# Patient Record
Sex: FEMALE | Race: WHITE | NOT HISPANIC OR LATINO | Employment: FULL TIME | URBAN - METROPOLITAN AREA
[De-identification: names, ages, dates, MRNs, and addresses within clinical notes are randomized per-mention and may not be internally consistent; named-entity substitution may affect disease eponyms.]

---

## 2023-10-23 ENCOUNTER — LAB REQUISITION (OUTPATIENT)
Dept: LAB | Facility: CLINIC | Age: 43
End: 2023-10-23

## 2023-10-23 DIAGNOSIS — R53.83 OTHER FATIGUE: ICD-10-CM

## 2023-10-23 DIAGNOSIS — Z13.220 ENCOUNTER FOR SCREENING FOR LIPOID DISORDERS: ICD-10-CM

## 2023-10-23 PROCEDURE — 80061 LIPID PANEL: CPT | Performed by: PHYSICIAN ASSISTANT

## 2023-10-23 PROCEDURE — 85652 RBC SED RATE AUTOMATED: CPT | Performed by: PHYSICIAN ASSISTANT

## 2023-10-23 PROCEDURE — 86140 C-REACTIVE PROTEIN: CPT | Performed by: PHYSICIAN ASSISTANT

## 2023-10-24 LAB
CHOLEST SERPL-MCNC: 177 MG/DL
CRP SERPL-MCNC: 9.56 MG/L
ERYTHROCYTE [SEDIMENTATION RATE] IN BLOOD BY WESTERGREN METHOD: 22 MM/HR (ref 0–20)
HDLC SERPL-MCNC: 49 MG/DL
LDLC SERPL CALC-MCNC: 85 MG/DL
NONHDLC SERPL-MCNC: 128 MG/DL
TRIGL SERPL-MCNC: 216 MG/DL

## 2023-10-26 ENCOUNTER — LAB REQUISITION (OUTPATIENT)
Dept: LAB | Facility: CLINIC | Age: 43
End: 2023-10-26

## 2023-10-26 DIAGNOSIS — R79.82 ELEVATED C-REACTIVE PROTEIN (CRP): ICD-10-CM

## 2023-10-26 DIAGNOSIS — R53.82 CHRONIC FATIGUE, UNSPECIFIED: ICD-10-CM

## 2023-10-26 PROCEDURE — 86038 ANTINUCLEAR ANTIBODIES: CPT | Performed by: STUDENT IN AN ORGANIZED HEALTH CARE EDUCATION/TRAINING PROGRAM

## 2023-10-26 PROCEDURE — 84443 ASSAY THYROID STIM HORMONE: CPT | Performed by: STUDENT IN AN ORGANIZED HEALTH CARE EDUCATION/TRAINING PROGRAM

## 2023-10-26 PROCEDURE — 84439 ASSAY OF FREE THYROXINE: CPT | Performed by: STUDENT IN AN ORGANIZED HEALTH CARE EDUCATION/TRAINING PROGRAM

## 2023-10-27 LAB
T4 FREE SERPL-MCNC: 2.24 NG/DL (ref 0.9–1.7)
TSH SERPL DL<=0.005 MIU/L-ACNC: 0.01 UIU/ML (ref 0.3–4.2)

## 2023-10-30 LAB
ANA PAT SER IF-IMP: ABNORMAL
ANA SER QL IF: ABNORMAL
ANA TITR SER IF: ABNORMAL {TITER}

## 2023-11-06 ENCOUNTER — LAB REQUISITION (OUTPATIENT)
Dept: LAB | Facility: CLINIC | Age: 43
End: 2023-11-06

## 2023-11-06 DIAGNOSIS — E05.90 THYROTOXICOSIS, UNSPECIFIED WITHOUT THYROTOXIC CRISIS OR STORM: ICD-10-CM

## 2023-11-06 PROCEDURE — 84480 ASSAY TRIIODOTHYRONINE (T3): CPT | Performed by: STUDENT IN AN ORGANIZED HEALTH CARE EDUCATION/TRAINING PROGRAM

## 2023-11-06 PROCEDURE — 83520 IMMUNOASSAY QUANT NOS NONAB: CPT | Performed by: STUDENT IN AN ORGANIZED HEALTH CARE EDUCATION/TRAINING PROGRAM

## 2023-11-06 PROCEDURE — 84439 ASSAY OF FREE THYROXINE: CPT | Performed by: STUDENT IN AN ORGANIZED HEALTH CARE EDUCATION/TRAINING PROGRAM

## 2023-11-06 PROCEDURE — 84443 ASSAY THYROID STIM HORMONE: CPT | Performed by: STUDENT IN AN ORGANIZED HEALTH CARE EDUCATION/TRAINING PROGRAM

## 2023-11-07 LAB
T3 SERPL-MCNC: 141 NG/DL (ref 85–202)
T4 FREE SERPL-MCNC: 1.74 NG/DL (ref 0.9–1.7)
TSH SERPL DL<=0.005 MIU/L-ACNC: <0.01 UIU/ML (ref 0.3–4.2)

## 2023-11-08 LAB — TSH RECEP AB SER-ACNC: <1.1 IU/L (ref 0–1.75)

## 2024-01-24 ENCOUNTER — APPOINTMENT (OUTPATIENT)
Dept: CT IMAGING | Facility: CLINIC | Age: 44
End: 2024-01-24
Attending: EMERGENCY MEDICINE
Payer: COMMERCIAL

## 2024-01-24 ENCOUNTER — APPOINTMENT (OUTPATIENT)
Dept: GENERAL RADIOLOGY | Facility: CLINIC | Age: 44
End: 2024-01-24
Attending: EMERGENCY MEDICINE
Payer: COMMERCIAL

## 2024-01-24 ENCOUNTER — HOSPITAL ENCOUNTER (EMERGENCY)
Facility: CLINIC | Age: 44
Discharge: HOME | End: 2024-01-24
Attending: EMERGENCY MEDICINE | Admitting: EMERGENCY MEDICINE
Payer: COMMERCIAL

## 2024-01-24 VITALS
OXYGEN SATURATION: 98 % | HEIGHT: 65 IN | HEART RATE: 70 BPM | SYSTOLIC BLOOD PRESSURE: 127 MMHG | WEIGHT: 175.49 LBS | RESPIRATION RATE: 16 BRPM | BODY MASS INDEX: 29.24 KG/M2 | DIASTOLIC BLOOD PRESSURE: 85 MMHG | TEMPERATURE: 97.9 F

## 2024-01-24 DIAGNOSIS — R07.9 CHEST PAIN, UNSPECIFIED TYPE: Primary | ICD-10-CM

## 2024-01-24 DIAGNOSIS — R53.83 FATIGUE, UNSPECIFIED TYPE: ICD-10-CM

## 2024-01-24 DIAGNOSIS — R06.02 SHORTNESS OF BREATH: ICD-10-CM

## 2024-01-24 DIAGNOSIS — Q34.1 MEDIASTINAL CYST: ICD-10-CM

## 2024-01-24 DIAGNOSIS — R53.1 GENERALIZED WEAKNESS: ICD-10-CM

## 2024-01-24 DIAGNOSIS — D17.79 ADRENAL MYELOLIPOMA: ICD-10-CM

## 2024-01-24 LAB
ALBUMIN SERPL BCG-MCNC: 4.5 G/DL (ref 3.5–5.2)
ALP SERPL-CCNC: 55 U/L (ref 40–150)
ALT SERPL W P-5'-P-CCNC: 19 U/L (ref 0–50)
ANION GAP SERPL CALCULATED.3IONS-SCNC: 12 MMOL/L (ref 7–15)
ANION GAP SERPL CALCULATED.3IONS-SCNC: 13 MMOL/L (ref 7–15)
AST SERPL W P-5'-P-CCNC: 22 U/L (ref 0–45)
ATRIAL RATE - MUSE: 62 BPM
BASOPHILS # BLD AUTO: 0.1 10E3/UL (ref 0–0.2)
BASOPHILS # BLD AUTO: 0.1 10E3/UL (ref 0–0.2)
BASOPHILS NFR BLD AUTO: 1 %
BASOPHILS NFR BLD AUTO: 1 %
BILIRUB SERPL-MCNC: 0.4 MG/DL
BUN SERPL-MCNC: 13.6 MG/DL (ref 6–20)
BUN SERPL-MCNC: 14.2 MG/DL (ref 6–20)
CALCIUM SERPL-MCNC: 9.2 MG/DL (ref 8.6–10)
CALCIUM SERPL-MCNC: 9.2 MG/DL (ref 8.6–10)
CHLORIDE SERPL-SCNC: 98 MMOL/L (ref 98–107)
CHLORIDE SERPL-SCNC: 99 MMOL/L (ref 98–107)
CREAT SERPL-MCNC: 0.57 MG/DL (ref 0.51–0.95)
CREAT SERPL-MCNC: 0.59 MG/DL (ref 0.51–0.95)
D DIMER PPP FEU-MCNC: 0.37 UG/ML FEU (ref 0–0.5)
DEPRECATED HCO3 PLAS-SCNC: 23 MMOL/L (ref 22–29)
DEPRECATED HCO3 PLAS-SCNC: 23 MMOL/L (ref 22–29)
DIASTOLIC BLOOD PRESSURE - MUSE: NORMAL MMHG
EGFRCR SERPLBLD CKD-EPI 2021: >90 ML/MIN/1.73M2
EGFRCR SERPLBLD CKD-EPI 2021: >90 ML/MIN/1.73M2
EOSINOPHIL # BLD AUTO: 0.2 10E3/UL (ref 0–0.7)
EOSINOPHIL # BLD AUTO: 0.2 10E3/UL (ref 0–0.7)
EOSINOPHIL NFR BLD AUTO: 2 %
EOSINOPHIL NFR BLD AUTO: 2 %
ERYTHROCYTE [DISTWIDTH] IN BLOOD BY AUTOMATED COUNT: 13.8 % (ref 10–15)
ERYTHROCYTE [DISTWIDTH] IN BLOOD BY AUTOMATED COUNT: 14 % (ref 10–15)
FLUAV RNA SPEC QL NAA+PROBE: NEGATIVE
FLUBV RNA RESP QL NAA+PROBE: NEGATIVE
GLUCOSE SERPL-MCNC: 101 MG/DL (ref 70–99)
GLUCOSE SERPL-MCNC: 99 MG/DL (ref 70–99)
HCG SERPL QL: NEGATIVE
HCT VFR BLD AUTO: 35.3 % (ref 35–47)
HCT VFR BLD AUTO: 36.5 % (ref 35–47)
HGB BLD-MCNC: 11.4 G/DL (ref 11.7–15.7)
HGB BLD-MCNC: 11.8 G/DL (ref 11.7–15.7)
HOLD SPECIMEN: NORMAL
HOLD SPECIMEN: NORMAL
IMM GRANULOCYTES # BLD: 0 10E3/UL
IMM GRANULOCYTES # BLD: 0.1 10E3/UL
IMM GRANULOCYTES NFR BLD: 0 %
IMM GRANULOCYTES NFR BLD: 1 %
INTERPRETATION ECG - MUSE: NORMAL
LYMPHOCYTES # BLD AUTO: 4.4 10E3/UL (ref 0.8–5.3)
LYMPHOCYTES # BLD AUTO: 4.7 10E3/UL (ref 0.8–5.3)
LYMPHOCYTES NFR BLD AUTO: 43 %
LYMPHOCYTES NFR BLD AUTO: 46 %
MCH RBC QN AUTO: 27.6 PG (ref 26.5–33)
MCH RBC QN AUTO: 27.8 PG (ref 26.5–33)
MCHC RBC AUTO-ENTMCNC: 32.3 G/DL (ref 31.5–36.5)
MCHC RBC AUTO-ENTMCNC: 32.3 G/DL (ref 31.5–36.5)
MCV RBC AUTO: 86 FL (ref 78–100)
MCV RBC AUTO: 86 FL (ref 78–100)
MONOCYTES # BLD AUTO: 0.5 10E3/UL (ref 0–1.3)
MONOCYTES # BLD AUTO: 0.5 10E3/UL (ref 0–1.3)
MONOCYTES NFR BLD AUTO: 4 %
MONOCYTES NFR BLD AUTO: 5 %
NEUTROPHILS # BLD AUTO: 4.8 10E3/UL (ref 1.6–8.3)
NEUTROPHILS # BLD AUTO: 5.1 10E3/UL (ref 1.6–8.3)
NEUTROPHILS NFR BLD AUTO: 46 %
NEUTROPHILS NFR BLD AUTO: 49 %
NRBC # BLD AUTO: 0 10E3/UL
NRBC # BLD AUTO: 0 10E3/UL
NRBC BLD AUTO-RTO: 0 /100
NRBC BLD AUTO-RTO: 0 /100
P AXIS - MUSE: 47 DEGREES
PLATELET # BLD AUTO: 295 10E3/UL (ref 150–450)
PLATELET # BLD AUTO: 307 10E3/UL (ref 150–450)
POTASSIUM SERPL-SCNC: 4 MMOL/L (ref 3.4–5.3)
POTASSIUM SERPL-SCNC: 4 MMOL/L (ref 3.4–5.3)
PR INTERVAL - MUSE: 132 MS
PROT SERPL-MCNC: 7.9 G/DL (ref 6.4–8.3)
QRS DURATION - MUSE: 78 MS
QT - MUSE: 432 MS
QTC - MUSE: 438 MS
R AXIS - MUSE: 7 DEGREES
RBC # BLD AUTO: 4.13 10E6/UL (ref 3.8–5.2)
RBC # BLD AUTO: 4.24 10E6/UL (ref 3.8–5.2)
RSV RNA SPEC NAA+PROBE: NEGATIVE
SARS-COV-2 RNA RESP QL NAA+PROBE: NEGATIVE
SODIUM SERPL-SCNC: 133 MMOL/L (ref 135–145)
SODIUM SERPL-SCNC: 135 MMOL/L (ref 135–145)
SYSTOLIC BLOOD PRESSURE - MUSE: NORMAL MMHG
T AXIS - MUSE: 18 DEGREES
TROPONIN T SERPL HS-MCNC: <6 NG/L
TROPONIN T SERPL HS-MCNC: <6 NG/L
VENTRICULAR RATE- MUSE: 62 BPM
WBC # BLD AUTO: 10.2 10E3/UL (ref 4–11)
WBC # BLD AUTO: 10.3 10E3/UL (ref 4–11)

## 2024-01-24 PROCEDURE — 80048 BASIC METABOLIC PNL TOTAL CA: CPT | Performed by: EMERGENCY MEDICINE

## 2024-01-24 PROCEDURE — 71260 CT THORAX DX C+: CPT

## 2024-01-24 PROCEDURE — 84703 CHORIONIC GONADOTROPIN ASSAY: CPT | Performed by: EMERGENCY MEDICINE

## 2024-01-24 PROCEDURE — 250N000009 HC RX 250: Performed by: EMERGENCY MEDICINE

## 2024-01-24 PROCEDURE — 36415 COLL VENOUS BLD VENIPUNCTURE: CPT | Performed by: EMERGENCY MEDICINE

## 2024-01-24 PROCEDURE — 82040 ASSAY OF SERUM ALBUMIN: CPT | Performed by: EMERGENCY MEDICINE

## 2024-01-24 PROCEDURE — 85379 FIBRIN DEGRADATION QUANT: CPT | Performed by: EMERGENCY MEDICINE

## 2024-01-24 PROCEDURE — 250N000013 HC RX MED GY IP 250 OP 250 PS 637: Performed by: EMERGENCY MEDICINE

## 2024-01-24 PROCEDURE — 84484 ASSAY OF TROPONIN QUANT: CPT | Performed by: EMERGENCY MEDICINE

## 2024-01-24 PROCEDURE — 96375 TX/PRO/DX INJ NEW DRUG ADDON: CPT

## 2024-01-24 PROCEDURE — 85025 COMPLETE CBC W/AUTO DIFF WBC: CPT | Performed by: EMERGENCY MEDICINE

## 2024-01-24 PROCEDURE — 71046 X-RAY EXAM CHEST 2 VIEWS: CPT

## 2024-01-24 PROCEDURE — 99285 EMERGENCY DEPT VISIT HI MDM: CPT | Mod: 25

## 2024-01-24 PROCEDURE — 250N000011 HC RX IP 250 OP 636: Performed by: EMERGENCY MEDICINE

## 2024-01-24 PROCEDURE — 87637 SARSCOV2&INF A&B&RSV AMP PRB: CPT | Performed by: EMERGENCY MEDICINE

## 2024-01-24 PROCEDURE — 96374 THER/PROPH/DIAG INJ IV PUSH: CPT | Mod: 59

## 2024-01-24 PROCEDURE — 93005 ELECTROCARDIOGRAM TRACING: CPT

## 2024-01-24 RX ORDER — IOPAMIDOL 755 MG/ML
120 INJECTION, SOLUTION INTRAVASCULAR ONCE
Status: COMPLETED | OUTPATIENT
Start: 2024-01-24 | End: 2024-01-24

## 2024-01-24 RX ORDER — DIPHENHYDRAMINE HYDROCHLORIDE 50 MG/ML
50 INJECTION INTRAMUSCULAR; INTRAVENOUS ONCE
Status: COMPLETED | OUTPATIENT
Start: 2024-01-24 | End: 2024-01-24

## 2024-01-24 RX ORDER — KETOROLAC TROMETHAMINE 15 MG/ML
15 INJECTION, SOLUTION INTRAMUSCULAR; INTRAVENOUS ONCE
Status: COMPLETED | OUTPATIENT
Start: 2024-01-24 | End: 2024-01-24

## 2024-01-24 RX ORDER — METHYLPREDNISOLONE SODIUM SUCCINATE 125 MG/2ML
125 INJECTION, POWDER, LYOPHILIZED, FOR SOLUTION INTRAMUSCULAR; INTRAVENOUS ONCE
Status: COMPLETED | OUTPATIENT
Start: 2024-01-24 | End: 2024-01-24

## 2024-01-24 RX ORDER — MAGNESIUM HYDROXIDE/ALUMINUM HYDROXICE/SIMETHICONE 120; 1200; 1200 MG/30ML; MG/30ML; MG/30ML
15 SUSPENSION ORAL ONCE
Status: COMPLETED | OUTPATIENT
Start: 2024-01-24 | End: 2024-01-24

## 2024-01-24 RX ORDER — ACETAMINOPHEN 325 MG/1
975 TABLET ORAL ONCE
Status: COMPLETED | OUTPATIENT
Start: 2024-01-24 | End: 2024-01-24

## 2024-01-24 RX ORDER — MORPHINE SULFATE 4 MG/ML
4 INJECTION, SOLUTION INTRAMUSCULAR; INTRAVENOUS
Status: COMPLETED | OUTPATIENT
Start: 2024-01-24 | End: 2024-01-24

## 2024-01-24 RX ORDER — ONDANSETRON 2 MG/ML
4 INJECTION INTRAMUSCULAR; INTRAVENOUS
Status: COMPLETED | OUTPATIENT
Start: 2024-01-24 | End: 2024-01-24

## 2024-01-24 RX ADMIN — DIPHENHYDRAMINE HYDROCHLORIDE 50 MG: 50 INJECTION INTRAMUSCULAR; INTRAVENOUS at 15:25

## 2024-01-24 RX ADMIN — MORPHINE SULFATE 4 MG: 4 INJECTION, SOLUTION INTRAMUSCULAR; INTRAVENOUS at 16:21

## 2024-01-24 RX ADMIN — KETOROLAC TROMETHAMINE 15 MG: 15 INJECTION, SOLUTION INTRAMUSCULAR; INTRAVENOUS at 14:22

## 2024-01-24 RX ADMIN — SODIUM CHLORIDE 60 ML: 9 INJECTION, SOLUTION INTRAVENOUS at 16:33

## 2024-01-24 RX ADMIN — ALUMINUM HYDROXIDE, MAGNESIUM HYDROXIDE, AND SIMETHICONE 15 ML: 200; 200; 20 SUSPENSION ORAL at 14:22

## 2024-01-24 RX ADMIN — METHYLPREDNISOLONE SODIUM SUCCINATE 125 MG: 125 INJECTION, POWDER, FOR SOLUTION INTRAMUSCULAR; INTRAVENOUS at 15:25

## 2024-01-24 RX ADMIN — ACETAMINOPHEN 975 MG: 325 TABLET, FILM COATED ORAL at 14:22

## 2024-01-24 RX ADMIN — ONDANSETRON 4 MG: 2 INJECTION INTRAMUSCULAR; INTRAVENOUS at 16:21

## 2024-01-24 RX ADMIN — IOPAMIDOL 72 ML: 755 INJECTION, SOLUTION INTRAVENOUS at 16:32

## 2024-01-24 ASSESSMENT — ENCOUNTER SYMPTOMS
CHEST TIGHTNESS: 1
WEAKNESS: 1
NECK PAIN: 0
NECK STIFFNESS: 0
HEADACHES: 0
CHILLS: 1
SHORTNESS OF BREATH: 1
DYSURIA: 0
COUGH: 1
HEMATURIA: 0
RHINORRHEA: 0
VOMITING: 0
NAUSEA: 1
ABDOMINAL PAIN: 0
BLOOD IN STOOL: 0
DIARRHEA: 0

## 2024-01-24 ASSESSMENT — ACTIVITIES OF DAILY LIVING (ADL)
ADLS_ACUITY_SCORE: 35

## 2024-01-24 NOTE — ED PROVIDER NOTES
"  History     Chief Complaint:  Chest Pain    The history is provided by the patient.     Scotty Ortiz is a 43 year old female with history of hyperlipidemia presenting for evaluation of chest pain. Scotty reports right-sided chest heaviness for the last two weeks that is exacerbated with inhalation and improved with repositioning. She describes the pain as sharp and intermittently present, more severe in the mornings and typically a 7/10.  She endorses that this pain radiates to her back near her bra line. She also notes a nonproductive cough, chills, nausea, dyspnea, and shortness of breath. She also notes difficulty sleeping, stating that she is often \"gasping for air\" throughout the night. She denies throat swelling, fevers, rhinorrhea, congestion, abdominal pain, hematuria, dysuria, black stool, or bloody stool. She denies leg swelling. She denies a history of GABRIEL. She denies a family history of aortic dissection or aneurysm. She states that she has felt generally weak since the summer and has been working with an endocrinologist to address the symptoms, noting that the weakness has worsened back to its initial summer severity recently.  She was told that she has thyroid disorder and was started on a thyroid medication.  She has not been taking this medication as she is not sure if it would worsen her condition.    Review of Systems   Constitutional:  Positive for chills.   HENT:  Negative for congestion and rhinorrhea.    Respiratory:  Positive for cough, chest tightness and shortness of breath.    Cardiovascular:  Positive for chest pain.   Gastrointestinal:  Positive for nausea. Negative for abdominal pain, blood in stool, diarrhea and vomiting.   Genitourinary:  Negative for dysuria and hematuria.   Musculoskeletal:  Negative for neck pain and neck stiffness.   Neurological:  Positive for weakness. Negative for headaches.     Independent Historian:   None - Patient Only    Review of External Notes: " "  10/18/23 Urgency Room Visit note    Medications:    Decadron  Mirena  Metformin  Zofran    Past Medical History:    HPV anogenital infection  Hyperlipidemia  Iron deficiency anemia due to chronic blood loss  Prediabetes    Past Surgical History:    Bladder suspension    Physical Exam   Patient Vitals for the past 24 hrs:   BP Temp Temp src Pulse Resp SpO2 Height Weight   01/24/24 1735 127/85 -- -- 70 -- 98 % -- --   01/24/24 1645 (!) 145/90 -- -- -- -- 100 % -- --   01/24/24 1501 -- -- -- 56 -- 100 % -- --   01/24/24 1417 (!) 141/88 -- -- 60 16 99 % -- --   01/24/24 1416 130/82 -- -- -- -- -- -- --   01/24/24 1104 139/85 97.9  F (36.6  C) Oral 60 18 100 % 1.651 m (5' 5\") 79.6 kg (175 lb 7.8 oz)      Constitutional:       General: Not in acute distress.     Appearance: Normal appearance.   HENT:      Head: Normocephalic and atraumatic.   Eyes:      Extraocular Movements: Extraocular movements intact.      Conjunctiva/sclera: Conjunctivae normal.   Cardiovascular:      Rate and Rhythm: Regular rate and rhythm.     Circulation: Even bilateral upper extremity pulses.  Pulmonary:      Effort: Pulmonary effort is normal. No respiratory distress.      Breath sounds: Normal breath sounds.  Abdominal:      General: Abdomen is flat. There is no distension.      Palpations: Abdomen is soft.      Tenderness: There is no abdominal tenderness.   Musculoskeletal:      Cervical back: Normal range of motion. No rigidity.      Right lower leg: No edema.      Left lower leg: No edema.   Skin:     General: Skin is warm and dry.   Neurological:      General: No focal deficit present.      Mental Status: Alert and oriented to person, place, and time.   Psychiatric:         Mood and Affect: Mood normal.         Behavior: Behavior normal.    Emergency Department Course   ECG  ECG results from 01/24/24   EKG 12 lead     Value    Systolic Blood Pressure     Diastolic Blood Pressure     Ventricular Rate 62    Atrial Rate 62    LA Interval " 132    QRS Duration 78        QTc 438    P Axis 47    R AXIS 7    T Axis 18    Interpretation ECG      Sinus rhythm  Normal ECG  No previous ECGs available  Unconfirmed report - interpretation of this ECG is computer generated - see medical record for final interpretation  Confirmed by - EMERGENCY ROOM, PHYSICIAN ([id number]),  JADON SPRING ([id number]) on 1/24/2024 12:23:16 PM       Normal sinus rhythm.  Rate of 62.  Normal AK and QRS.  Normal QTc.  No acute ST elevation or depression.  No prior ECG for comparison.    See ED course for independent interpretation.     Imaging:  CT Aortic Survey w Contrast   Final Result   IMPRESSION:      1.  No aortic dissection or other acute aortic abnormality.      2.  No pulmonary embolism or other acute intrathoracic abnormality.      3.  No acute process in the abdomen or pelvis.      4.  Incidental nonemergent findings as described in the report body.       XR Chest 2 Views   Final Result   IMPRESSION: No acute cardiopulmonary disease.      LEATHA NEWELL MD            SYSTEM ID:  [id number]         Report per radiology    Laboratory:  Labs Ordered and Resulted from Time of ED Arrival to Time of ED Departure   CBC WITH PLATELETS AND DIFFERENTIAL - Abnormal       Result Value    WBC Count 10.3      RBC Count 4.13      Hemoglobin 11.4 (*)     Hematocrit 35.3      MCV 86      MCH 27.6      MCHC 32.3      RDW 13.8      Platelet Count 295      % Neutrophils 49      % Lymphocytes 43      % Monocytes 5      % Eosinophils 2      % Basophils 1      % Immature Granulocytes 0      NRBCs per 100 WBC 0      Absolute Neutrophils 5.1      Absolute Lymphocytes 4.4      Absolute Monocytes 0.5      Absolute Eosinophils 0.2      Absolute Basophils 0.1      Absolute Immature Granulocytes 0.0      Absolute NRBCs 0.0     COMPREHENSIVE METABOLIC PANEL - Abnormal    Sodium 133 (*)     Potassium 4.0      Carbon Dioxide (CO2) 23      Anion Gap 12      Urea Nitrogen 13.6      Creatinine 0.57       GFR Estimate >90      Calcium 9.2      Chloride 98      Glucose 101 (*)     Alkaline Phosphatase 55      AST 22      ALT 19      Protein Total 7.9      Albumin 4.5      Bilirubin Total 0.4     TROPONIN T, HIGH SENSITIVITY - Normal    Troponin T, High Sensitivity <6     BASIC METABOLIC PANEL - Normal    Sodium 135      Potassium 4.0      Chloride 99      Carbon Dioxide (CO2) 23      Anion Gap 13      Urea Nitrogen 14.2      Creatinine 0.59      GFR Estimate >90      Calcium 9.2      Glucose 99     D DIMER QUANTITATIVE - Normal    D-Dimer Quantitative 0.37     INFLUENZA A/B, RSV, & SARS-COV2 PCR - Normal    Influenza A PCR Negative      Influenza B PCR Negative      RSV PCR Negative      SARS CoV2 PCR Negative     TROPONIN T, HIGH SENSITIVITY - Normal    Troponin T, High Sensitivity <6     HCG QUALITATIVE PREGNANCY - Normal    hCG Serum Qualitative Negative     CBC WITH PLATELETS AND DIFFERENTIAL    WBC Count 10.2      RBC Count 4.24      Hemoglobin 11.8      Hematocrit 36.5      MCV 86      MCH 27.8      MCHC 32.3      RDW 14.0      Platelet Count 307      % Neutrophils 46      % Lymphocytes 46      % Monocytes 4      % Eosinophils 2      % Basophils 1      % Immature Granulocytes 1      NRBCs per 100 WBC 0      Absolute Neutrophils 4.8      Absolute Lymphocytes 4.7      Absolute Monocytes 0.5      Absolute Eosinophils 0.2      Absolute Basophils 0.1      Absolute Immature Granulocytes 0.1      Absolute NRBCs 0.0       Emergency Department Course & Assessments:       Interventions:  Medications   ketorolac (TORADOL) injection 15 mg (15 mg Intravenous $Given 1/24/24 1422)   acetaminophen (TYLENOL) tablet 975 mg (975 mg Oral $Given 1/24/24 1422)   alum & mag hydroxide-simethicone (MAALOX) suspension 15 mL (15 mLs Oral $Given 1/24/24 1422)   methylPREDNISolone sodium succinate (solu-MEDROL) injection 125 mg (125 mg Intravenous $Given 1/24/24 1525)   diphenhydrAMINE (BENADRYL) injection 50 mg (50 mg Intravenous $Given  1/24/24 1525)   morphine (PF) injection 4 mg (4 mg Intravenous $Given 1/24/24 1621)   ondansetron (ZOFRAN) injection 4 mg (4 mg Intravenous $Given 1/24/24 1621)   iopamidol (ISOVUE-370) solution 120 mL (72 mLs Intravenous $Given 1/24/24 1632)   Saline CT scan flush (60 mLs Intravenous $Given 1/24/24 1633)     Independent Interpretation (X-rays, CTs, rhythm strip):  On my independent interpretation of chest x-ray, there is no obvious focal opacity, mediastinal widening, or pneumothorax.    Assessments/Consultations/Discussion of Management or Tests:  ED Course as of 01/24/24 1856   Wed Jan 24, 2024   1156 I obtained history and examined the patient as noted above.    1204 EKG 12 lead  Normal sinus rhythm.  Rate of 62.  Normal TX and QRS.  Normal QTc.  No acute ST elevation or depression.  No prior ECG for comparison.   1224 Troponin T, High Sensitivity: <6   1342 D-Dimer Quantitative: 0.37   1356 Troponin T, High Sensitivity: <6  2 set negative   1404 XR Chest 2 Views  On my independent interpretation of chest x-ray, there is no obvious focal opacity, mediastinal widening, or pneumothorax.   1500 On reevaluation, patient endorses that medication did not help with her symptoms.  She is endorsing still sharp chest pain in midsternum that radiates to her back.  At this time, dimer negative, no pulse deficits, systolic pressure difference less than 20 and there is no mediastinal widening on chest x-ray.  However, after shared decision discussion, patient is concerned about dissection and would prefer to undergo CT aortic survey.  No history of anaphylaxis, will pretreat with IV Solu-Medrol and Benadryl prior to imaging due to history of iodine allergy.   1730 CT Aortic Survey w Contrast  Simple 1.8 x 1.2 cm anterior mediastinal cyst   1755 On reevaluation, patient's pain completely resolved after morphine. Patient updated on CT findings including all incidental findings.  Unclear cause of patient's chest pain today.  Low  risk heart score.  No indication for stress testing at this time.  No evidence of dissection on CT aortic survey.  There is an incidental finding of a mediastinal cyst, pending thoracic surgery callback.  Patient otherwise has not had any adverse side effects/allergic reaction since receiving IV contrast dye with pretreatment.  Advised for patient to note this for her future.  Pending callback from thoracic surgery, anticipate discharge home.  Patient is to follow-up with PCP for GABRIEL workup and further evaluation for her symptoms.  Patient is supposed to be on thyroid medication.  Advised for patient to take medication as instructed or consult prescribing physician.  Discussed return precautions.  Answered all questions.  Patient and significant other voiced understanding and agreement with plan.   1824 I spoke with Dr. Lux, thoracic surgery.   1827 I discussed phone call conversation with thoracic surgery with the patient.  Will discharge to outpatient follow-up.  Discussed return precautions.  Answered all questions.  Patient and family voiced understanding and agreement with plan.     Social Determinants of Health affecting care:   None    Disposition:  The patient was discharged to home.     Impression & Plan      HEART Score  Background  Calculates the overall risk of adverse event in patient's presenting with chest pain.  Based on 5 criteria (each assigned 0-2 points) including suspiciousness of history, EKG, age, risk factors and troponin.    Data  43 year old female  does not have a problem list on file.   has no history on file for tobacco use.  family history is not on file.  Recent Labs   Lab 01/24/24  1314 01/24/24  1133   CTROPT <6 <6     Criteria   0-2 points for each of 5 items (maximum of 10 points):  Score 0- History slightly suspicious for coronary syndrome  Score 0- EKG Normal  Score 1- Age 45 to 65 years old  Score 2- Three or more risk factors for or history of atherosclerotic  disease  Score 0- Within normal limits for troponin levels  Interpretation  Risk of adverse outcome  Heart Score: 3  Total Score 0-3- Adverse Outcome Risk 2.5% - Supports early discharge with appropriate follow-up    Medical Decision Makin-year-old female as described above presents to the emergency department for chest pain and shortness of breath.  Patient hemodynamically stable at time of evaluation.  Afebrile.  Saturating 100% on room air.  Differential diagnosis considered includes, but not limited to, ACS, PE, pericarditis/myocarditis, and thoracic aortic aneurysm/dissection.  History of PE postoperatively.  Cardiac workup ordered.  2 set cardiac enzymes.  D-dimer for screening for pulmonary embolism and indirect evaluation for thoracic aortic dissection.  The patient does have an iodine allergy with a distant history, but endorses having hives, but no anaphylaxis requiring epinephrine injection.  If dimer positive, willing to undergo pretreatment imaging.  Discussed care plan with patient who voiced understanding and agreement with plan.  Answered all questions.  Additional workup and orders as listed in chart.     Please refer to ED course above as part of continuation of MDM for details on the patient's treatment course and any changes or updates in care plan beyond my initial evaluation and MDM creation.    Diagnosis:    ICD-10-CM    1. Chest pain, unspecified type  R07.9       2. Shortness of breath  R06.02       3. Generalized weakness  R53.1       4. Fatigue, unspecified type  R53.83       5. Mediastinal cyst  Q34.1       6. Adrenal myelolipoma  D17.79         Scribe Disclosure:  Sheela REID, am serving as a scribe at 11:55 AM on 2024 to document services personally performed by Ace Mancilla DO based on my observations and the provider's statements to me.   2024   Ace Mancilla DO Yeh, Ferris, DO  [id number]

## 2024-01-24 NOTE — ED TRIAGE NOTES
Pt arrives with SOB and chest pain for the past week. Pt has been seen in clinic for symptoms and started BP medications back in October. Symptoms keep returning and pt was informed to go to ER for further workup. HX of PE back in 2013, pt was taking thinners for a few months after this but is not currently taking any medications. ABCS intact and Aox4.      Triage Assessment (Adult)       Row Name 01/24/24 1105          Triage Assessment    Airway WDL WDL        Respiratory WDL    Respiratory WDL X     Rhythm/Pattern, Respiratory shortness of breath        Cardiac WDL    Cardiac WDL X        Chest Pain Assessment    Chest Pain Location midsternal

## 2024-01-25 ENCOUNTER — TRANSCRIBE ORDERS (OUTPATIENT)
Dept: OTHER | Age: 44
End: 2024-01-25

## 2024-01-31 ENCOUNTER — PATIENT OUTREACH (OUTPATIENT)
Dept: ONCOLOGY | Facility: CLINIC | Age: 44
End: 2024-01-31
Payer: COMMERCIAL

## 2024-01-31 NOTE — PROGRESS NOTES
New Patient Oncology Nurse Navigator Note     Referring provider: Self    Referring Clinic/Organization: [facility]  Referred to: Thoracic Surgery  Requested provider (if applicable): First available - did not specify   Referral Received: 01/25/24       Evaluation for :   Diagnosis   Q34.9 (ICD-10-CM) - Thoracic cyst      Note:  Self referral for consult for cyst in chest. Patient was seen at [facility] and the told her to call and schedule with Dr. Lux. Referral from telephone call with the patient.    Clinical History (per Nurse review of records provided):      01/24/2024 CT Aortic Survey (bookmarked) showed:   FINDINGS:      CT ANGIOGRAM CHEST, ABDOMEN, AND PELVIS: No aortic dissection, intramural hematoma, or other acute aortic abnormality, noting that evaluation of the ascending thoracic aorta is slightly limited due to motion artifact. No aortic aneurysm. Mild mixed   atherosclerotic plaque primarily within the infrarenal aorta. Aortic arch vessels and their visualized branches are patent. Visceral arteries and their visualized branches are patent. Iliofemoral arteries are patent throughout their course. Pulmonary   arteries are normal in caliber and negative for pulmonary emboli.     LUNGS AND PLEURA: No consolidations, pleural effusions, or pneumothorax. Tiny right upper lobe calcified granuloma. Minimal scarring within the right middle lobe. Central airways are patent.     MEDIASTINUM/AXILLAE: Normal cardiac size. No pericardial effusion. Simple 1.8 x 1.2 cm anterior mediastinal cyst. No enlarged thoracic lymph node.     CORONARY ARTERY CALCIFICATION: Mild.     HEPATOBILIARY: Probable diffuse hepatic steatosis. No calcified gallstones or biliary ductal dilation.     PANCREAS: Normal.     SPLEEN: Normal.     ADRENAL GLANDS: 0.8 cm right adrenal myelolipoma. Normal left adrenal gland.     KIDNEYS/BLADDER: Normal.     BOWEL: No obstruction or inflammation. Normal appendix. Few sigmoid  colonic diverticuli.     LYMPH NODES: No enlarged lymph nodes.     PELVIC ORGANS: Normal.     MUSCULOSKELETAL: Tiny fat-containing periumbilical hernia. Multilevel degenerative changes of the spine. No acute bony abnormality.                                                                      IMPRESSION:     1.  No aortic dissection or other acute aortic abnormality.     2.  No pulmonary embolism or other acute intrathoracic abnormality.     3.  No acute process in the abdomen or pelvis.     4.  Incidental nonemergent findings as described in the report body    Clinical Assessment / Barriers to Care (Per Nurse):      Records Location: [facility]     Records Needed: None  Additional testing needed prior to consult: PFTs, CT Chest  Referral updates and Plan:     Writer called Scotty and introduced myself and my role as a nurse navigator. We discussed the referral and Scotty confirmed she is ready to schedule. She was updated that we will need PFTs and CT chest prior to consult. Scotty reports [facility] probably the most convenient location for the consult. Will have schedulers  reach to schedule her appts.     DEENA AzulN, RN, OCN  [facility] Oncology Nurse Navigator  [phone] / [phone]

## 2024-02-16 NOTE — TELEPHONE ENCOUNTER
RECORDS STATUS - ALL OTHER DIAGNOSIS      RECORDS RECEIVED FROM: [facility] - Internal records   DATE RECEIVED: 2/16

## 2024-02-26 RX ORDER — ALBUTEROL SULFATE 0.83 MG/ML
2.5 SOLUTION RESPIRATORY (INHALATION) ONCE
Status: COMPLETED | OUTPATIENT
Start: 2024-02-27 | End: 2024-02-27

## 2024-02-27 ENCOUNTER — HOSPITAL ENCOUNTER (OUTPATIENT)
Dept: CT IMAGING | Facility: CLINIC | Age: 44
Discharge: HOME | End: 2024-02-27
Attending: CLINICAL NURSE SPECIALIST
Payer: COMMERCIAL

## 2024-02-27 ENCOUNTER — HOSPITAL ENCOUNTER (OUTPATIENT)
Dept: RESPIRATORY THERAPY | Facility: CLINIC | Age: 44
Discharge: HOME | End: 2024-02-27
Attending: CLINICAL NURSE SPECIALIST
Payer: COMMERCIAL

## 2024-02-27 LAB — HGB BLD-MCNC: 11.6 G/DL (ref 11.7–15.7)

## 2024-02-27 PROCEDURE — 94729 DIFFUSING CAPACITY: CPT | Mod: 26 | Performed by: INTERNAL MEDICINE

## 2024-02-27 PROCEDURE — 94726 PLETHYSMOGRAPHY LUNG VOLUMES: CPT | Mod: 26 | Performed by: INTERNAL MEDICINE

## 2024-02-27 PROCEDURE — 94726 PLETHYSMOGRAPHY LUNG VOLUMES: CPT

## 2024-02-27 PROCEDURE — 85018 HEMOGLOBIN: CPT | Performed by: CLINICAL NURSE SPECIALIST

## 2024-02-27 PROCEDURE — 999N000157 HC STATISTIC RCP TIME EA 10 MIN

## 2024-02-27 PROCEDURE — 94729 DIFFUSING CAPACITY: CPT

## 2024-02-27 PROCEDURE — 250N000009 HC RX 250: Performed by: CLINICAL NURSE SPECIALIST

## 2024-02-27 PROCEDURE — 94060 EVALUATION OF WHEEZING: CPT | Mod: 26 | Performed by: INTERNAL MEDICINE

## 2024-02-27 PROCEDURE — 71250 CT THORAX DX C-: CPT

## 2024-02-27 PROCEDURE — 36415 COLL VENOUS BLD VENIPUNCTURE: CPT | Performed by: CLINICAL NURSE SPECIALIST

## 2024-02-27 PROCEDURE — 94060 EVALUATION OF WHEEZING: CPT

## 2024-02-27 RX ADMIN — ALBUTEROL SULFATE 2.5 MG: 2.5 SOLUTION RESPIRATORY (INHALATION) at 08:48

## 2024-03-03 NOTE — PROGRESS NOTES
"THORACIC SURGERY - NEW PATIENT OFFICE VISIT      Dear Dr. Triana,    I saw Scotty Ortiz at Dr. Mancilla s request in consultation for the evaluation and treatment of an anterior mediastinal cyst.     HPI  Scotty Ortiz is a 43 year old female who was incidentally found to have an anterior medastinal cyst during an emergency department visit for chest pain, which has since resolved. She had also had some episodes of gasping for air at night which has improved. She had been stressed at the time. She remains stressed with her mother about to have heart surgery and one daughter about to have a baby.    She has gained 20 pounds over the past few months.     Previsit Tests   PFT 2/27/2024: FEV1 3.02 (108%); DLCO 93%    CT chest 2/27/2024: 1.8 x 1.2 cm anterior mediastinal cyst      CT aortic survey 1/24/2024: 1.8 x 1.2 cm anterior mediastinal cyst    PMH  Reviewed, as below    HPV anogenital infection  Hyperlipidemia  Iron deficiency anemia due to chronic blood loss  Prediabetes  Hypothyroidism      PSH  Reviewed, as below    Bladder suspension     ETOH: Occasionally  TOB:  Ages 14 - 41, 1 pack per week.  Now vapes nicotine.  Other drugs:  None    Physical examination  /78   Pulse 70   Temp 97  F (36.1  C) (Tympanic)   Resp 16   Ht 1.651 m (5' 5\")   Wt 80.3 kg (177 lb)   SpO2 99%   BMI 29.45 kg/m     Physical Exam  Eyes:      Conjunctiva/sclera: Conjunctivae normal.   Cardiovascular:      Rate and Rhythm: Normal rate.   Pulmonary:      Effort: Pulmonary effort is normal.   Skin:     General: Skin is warm and dry.   Neurological:      Mental Status: She is alert and oriented to person, place, and time.   Psychiatric:         Mood and Affect: Mood normal.         Behavior: Behavior normal.         Thought Content: Thought content normal.         Judgment: Judgment normal.          From a personal perspective, she is here with her . They have four children and expect their first grandchild. She works for " [employer] in customer service.       IMPRESSION (Q34.9) Thoracic cyst    This person is a 43 year old female with an anterior mediastinal cyst.    PLAN  I spent 15 min on the date of the encounter in chart review, patient visit, review of tests, documentation and/or discussion with other providers about the issues documented above. I reviewed the plan as follows:    I reassured her the mediastinal cyst is not causing her symptoms.    Necessary Preop Tests & Appointments: CT Chest 6 months      I appreciate the opportunity to participate in the care of your patient and will keep you updated.    Sincerely,    Morgan Lux MD

## 2024-03-04 ENCOUNTER — PRE VISIT (OUTPATIENT)
Dept: ONCOLOGY | Facility: CLINIC | Age: 44
End: 2024-03-04
Payer: COMMERCIAL

## 2024-03-04 ENCOUNTER — PATIENT OUTREACH (OUTPATIENT)
Dept: ONCOLOGY | Facility: CLINIC | Age: 44
End: 2024-03-04
Payer: COMMERCIAL

## 2024-03-04 ENCOUNTER — ONCOLOGY VISIT (OUTPATIENT)
Dept: ONCOLOGY | Facility: CLINIC | Age: 44
End: 2024-03-04
Attending: THORACIC SURGERY (CARDIOTHORACIC VASCULAR SURGERY)
Payer: COMMERCIAL

## 2024-03-04 VITALS
TEMPERATURE: 97 F | WEIGHT: 177 LBS | SYSTOLIC BLOOD PRESSURE: 133 MMHG | HEART RATE: 70 BPM | HEIGHT: 65 IN | BODY MASS INDEX: 29.49 KG/M2 | RESPIRATION RATE: 16 BRPM | OXYGEN SATURATION: 99 % | DIASTOLIC BLOOD PRESSURE: 78 MMHG

## 2024-03-04 PROCEDURE — 99203 OFFICE O/P NEW LOW 30 MIN: CPT | Performed by: THORACIC SURGERY (CARDIOTHORACIC VASCULAR SURGERY)

## 2024-03-04 PROCEDURE — 99213 OFFICE O/P EST LOW 20 MIN: CPT | Performed by: THORACIC SURGERY (CARDIOTHORACIC VASCULAR SURGERY)

## 2024-03-04 RX ORDER — ATENOLOL 25 MG/1
25 TABLET ORAL
COMMUNITY
Start: 2024-02-23

## 2024-03-04 ASSESSMENT — PAIN SCALES - GENERAL: PAINLEVEL: NO PAIN (0)

## 2024-03-04 NOTE — NURSING NOTE
"Oncology Rooming Note    March 4, 2024 7:55 AM   Scotty Ortiz is a 43 year old female who presents for:    Chief Complaint   Patient presents with    Oncology Clinic Visit     Thoracic cyst - new patient     Initial Vitals: /78   Pulse 70   Temp 97  F (36.1  C) (Tympanic)   Resp 16   Ht 1.651 m (5' 5\")   Wt 80.3 kg (177 lb)   SpO2 99%   BMI 29.45 kg/m   Estimated body mass index is 29.45 kg/m  as calculated from the following:    Height as of this encounter: 1.651 m (5' 5\").    Weight as of this encounter: 80.3 kg (177 lb). Body surface area is 1.92 meters squared.  No Pain (0) Comment: Data Unavailable   No LMP recorded.  Allergies reviewed: Yes  Medications reviewed: Yes    Medications: Medication refills not needed today.  Pharmacy name entered into [facility]: [id number] [place] [id number] AT [facility] [address]    Frailty Screening:   Is the patient here for a new oncology consult visit in cancer care? 2. No      Clinical concerns: new patient       Shavon Arias CMA              "

## 2024-03-04 NOTE — LETTER
Patient:  Scotty Ortiz  :   1980    Patient Name:  Scotty Ortiz    Physician: Morgan Lux MD    Patient was seen in clinic today and is under my care. She is able to return to work on 2024.    Patient Limitations:    None    If you have any questions or concerns please contact our clinic at [phone].  Sincerely,       Morgan Lux MD

## 2024-03-04 NOTE — LETTER
"    3/4/2024         RE: Scotty Ortiz  [address]  [facility] [place] [address]        Dear Colleague,    Thank you for referring your patient, Scotty Ortiz, to the [facility]. Please see a copy of my visit note below.    THORACIC SURGERY - NEW PATIENT OFFICE VISIT      Dear Dr. Triana,    I saw Scotty Ortiz at Dr. Mancilla s request in consultation for the evaluation and treatment of an anterior mediastinal cyst.     HPI  Scotty Ortiz is a 43 year old female who was incidentally found to have an anterior medastinal cyst during an emergency department visit for chest pain, which has since resolved. She had also had some episodes of gasping for air at night which has improved. She had been stressed at the time. She remains stressed with her mother about to have heart surgery and one daughter about to have a baby.    She has gained 20 pounds over the past few months.     Previsit Tests   PFT 2/27/2024: FEV1 3.02 (108%); DLCO 93%    CT chest 2/27/2024: 1.8 x 1.2 cm anterior mediastinal cyst      CT aortic survey 1/24/2024: 1.8 x 1.2 cm anterior mediastinal cyst    PMH  Reviewed, as below    HPV anogenital infection  Hyperlipidemia  Iron deficiency anemia due to chronic blood loss  Prediabetes  Hypothyroidism      PSH  Reviewed, as below    Bladder suspension     ETOH: Occasionally  TOB:  Ages 14 - 41, 1 pack per week.  Now vapes nicotine.  Other drugs:  None    Physical examination  /78   Pulse 70   Temp 97  F (36.1  C) (Tympanic)   Resp 16   Ht 1.651 m (5' 5\")   Wt 80.3 kg (177 lb)   SpO2 99%   BMI 29.45 kg/m     Physical Exam  Eyes:      Conjunctiva/sclera: Conjunctivae normal.   Cardiovascular:      Rate and Rhythm: Normal rate.   Pulmonary:      Effort: Pulmonary effort is normal.   Skin:     General: Skin is warm and dry.   Neurological:      Mental Status: She is alert and oriented to person, place, and time.   Psychiatric:         Mood and Affect: Mood normal.    "      Behavior: Behavior normal.         Thought Content: Thought content normal.         Judgment: Judgment normal.          From a personal perspective, she is here with her . They have four children and expect their first grandchild. She works for [employer] in [employer] service.       IMPRESSION (Q34.9) Thoracic cyst    This person is a 43 year old female with an anterior mediastinal cyst.    PLAN  I spent 15 min on the date of the encounter in chart review, patient visit, review of tests, documentation and/or discussion with other providers about the issues documented above. I reviewed the plan as follows:    I reassured her the mediastinal cyst is not causing her symptoms.    Necessary Preop Tests & Appointments: CT Chest 6 months      I appreciate the opportunity to participate in the care of your patient and will keep you updated.    Sincerely,    Morgan Lux MD        Again, thank you for allowing me to participate in the care of your patient.        Sincerely,        Morgan Lux MD

## 2024-03-04 NOTE — LETTER
March 4, 2024    Scotty Ortiz  [address]  [facility] [place] [id number]    To whom it may concern:    Scotty Ortiz was see in clinic today under my care.

## 2024-03-06 LAB
DLCOCOR-%PRED-PRE: 93 %
DLCOCOR-PRE: 20.14 ML/MIN/MMHG
DLCOUNC-%PRED-PRE: 87 %
DLCOUNC-PRE: 18.93 ML/MIN/MMHG
DLCOUNC-PRED: 21.55 ML/MIN/MMHG
ERV-%PRED-PRE: 47 %
ERV-PRE: 0.59 L
ERV-PRED: 1.24 L
EXPTIME-PRE: 5.11 SEC
FEF2575-%PRED-POST: 168 %
FEF2575-%PRED-PRE: 123 %
FEF2575-POST: 4.81 L/SEC
FEF2575-PRE: 3.52 L/SEC
FEF2575-PRED: 2.85 L/SEC
FEFMAX-%PRED-PRE: 95 %
FEFMAX-PRE: 6.69 L/SEC
FEFMAX-PRED: 7.02 L/SEC
FEV1-%PRED-PRE: 108 %
FEV1-PRE: 3.02 L
FEV1FEV6-PRE: 87 %
FEV1FEV6-PRED: 83 %
FEV1FVC-PRE: 88 %
FEV1FVC-PRED: 82 %
FEV1SVC-PRE: 92 %
FEV1SVC-PRED: 78 %
FIFMAX-PRE: 3.41 L/SEC
FRCPLETH-%PRED-PRE: 95 %
FRCPLETH-PRE: 2.61 L
FRCPLETH-PRED: 2.74 L
FVC-%PRED-PRE: 101 %
FVC-PRE: 3.44 L
FVC-PRED: 3.4 L
IC-%PRED-PRE: 109 %
IC-PRE: 2.7 L
IC-PRED: 2.47 L
RVPLETH-%PRED-PRE: 118 %
RVPLETH-PRE: 2.03 L
RVPLETH-PRED: 1.72 L
TLCPLETH-%PRED-PRE: 104 %
TLCPLETH-PRE: 5.31 L
TLCPLETH-PRED: 5.11 L
VA-%PRED-PRE: 102 %
VA-PRE: 5.14 L
VC-%PRED-PRE: 92 %
VC-PRE: 3.29 L
VC-PRED: 3.55 L

## 2024-10-01 PROBLEM — Q34.9 CONGENITAL MALFORMATION OF RESPIRATORY SYSTEM, UNSPECIFIED: Status: ACTIVE | Noted: 2024-03-04

## 2024-11-24 NOTE — PROGRESS NOTES
"THORACIC SURGERY - FOLLOW UP  OFFICE VISIT       Dear Dr. Triana,     I saw Scotty Ortiz at Dr. Mancilla s request in consultation for the evaluation and treatment of an anterior mediastinal cyst.      HPI  Scotty Ortiz is a 44 year old female who was incidentally found to have an anterior medastinal cyst during an emergency department visit for chest pain, which has since resolved. I saw her on 3/4/2024. At that time, she had also had some episodes of gasping for air at night which has improved. She had been stressed at the time. She remains stressed with her mother about to have heart surgery and one daughter about to have a baby. She had gained 20 pounds over the past few months.     Today, 11/25/2024, she is well and has no new issues.     Previsit Tests   CT chest 11/25/2024: Stable thymic cyst        PFT 2/27/2024: FEV1 3.02 (108%); DLCO 93%     CT chest 2/27/2024: 1.8 x 1.2 cm anterior mediastinal cyst       CT aortic survey 1/24/2024: 1.8 x 1.2 cm anterior mediastinal cyst     PMH  Reviewed, as below     HPV anogenital infection  Hyperlipidemia  Iron deficiency anemia due to chronic blood loss  Prediabetes  Hypothyroidism        PSH  Reviewed, as below     Bladder suspension      ETOH: Occasionally  TOB:  Ages 14 - 41, 1 pack per week.  Now vapes nicotine.  Other drugs:  None     Physical examination  /74 (Cuff Size: Adult Regular)   Pulse 73   Temp 97.4  F (36.3  C) (Temporal)   Resp 16   Ht 1.651 m (5' 5\")   Wt 72.6 kg (160 lb)   SpO2 98%   BMI 26.63 kg/m      Physical Exam  Eyes:      Conjunctiva/sclera: Conjunctivae normal.   Cardiovascular:      Rate and Rhythm: Normal rate.   Pulmonary:      Effort: Pulmonary effort is normal.   Skin:     General: Skin is warm and dry.   Neurological:      Mental Status: She is alert and oriented to person, place, and time.   Psychiatric:         Mood and Affect: Mood normal.         Behavior: Behavior normal.         Thought Content: Thought content normal.  "        Judgment: Judgment normal.            From a personal perspective, she is here with her . They have four children and one grandchild. She works for [employer] in [employer] service.         IMPRESSION (Q34.9) Thoracic cyst     This person is a 44 year old female with a stable anterior mediastinal cyst.     PLAN  I spent 10 min on the date of the encounter in chart review, patient visit, review of tests, documentation and/or discussion with other providers about the issues documented above. I reviewed the plan as follows:     I reassured her the mediastinal cyst is stable     Necessary Preop Tests & Appointments: CT Chest 12 months        I appreciate the opportunity to participate in the care of your patient and will keep you updated.     Sincerely,      Morgan Lux MD

## 2024-11-25 ENCOUNTER — HOSPITAL ENCOUNTER (OUTPATIENT)
Dept: CT IMAGING | Facility: CLINIC | Age: 44
Discharge: HOME | End: 2024-11-25
Attending: THORACIC SURGERY (CARDIOTHORACIC VASCULAR SURGERY) | Admitting: THORACIC SURGERY (CARDIOTHORACIC VASCULAR SURGERY)
Payer: COMMERCIAL

## 2024-11-25 ENCOUNTER — ONCOLOGY VISIT (OUTPATIENT)
Dept: ONCOLOGY | Facility: CLINIC | Age: 44
End: 2024-11-25
Attending: THORACIC SURGERY (CARDIOTHORACIC VASCULAR SURGERY)
Payer: COMMERCIAL

## 2024-11-25 VITALS
DIASTOLIC BLOOD PRESSURE: 74 MMHG | WEIGHT: 160 LBS | HEART RATE: 73 BPM | SYSTOLIC BLOOD PRESSURE: 127 MMHG | OXYGEN SATURATION: 98 % | RESPIRATION RATE: 16 BRPM | TEMPERATURE: 97.4 F | BODY MASS INDEX: 26.66 KG/M2 | HEIGHT: 65 IN

## 2024-11-25 DIAGNOSIS — Q34.1 MEDIASTINAL CYST: Primary | ICD-10-CM

## 2024-11-25 DIAGNOSIS — Q34.9 CONGENITAL MALFORMATION OF RESPIRATORY SYSTEM, UNSPECIFIED: ICD-10-CM

## 2024-11-25 PROCEDURE — 99213 OFFICE O/P EST LOW 20 MIN: CPT | Performed by: THORACIC SURGERY (CARDIOTHORACIC VASCULAR SURGERY)

## 2024-11-25 PROCEDURE — 71250 CT THORAX DX C-: CPT

## 2024-11-25 ASSESSMENT — PAIN SCALES - GENERAL: PAINLEVEL_OUTOF10: NO PAIN (0)

## 2024-11-25 NOTE — LETTER
"11/25/2024      Scotty Aragon  [address]  [facility] [place] [address]      Dear Colleague,    Thank you for referring your patient, Scotty Aragon, to the [facility] [place]. Please see a copy of my visit note below.    THORACIC SURGERY - FOLLOW UP  OFFICE VISIT       Dear Dr. Triana,     I saw Scotty Ortiz at Dr. Mancilla s request in consultation for the evaluation and treatment of an anterior mediastinal cyst.      HPI  Scotty Ortiz is a 44 year old female who was incidentally found to have an anterior medastinal cyst during an emergency department visit for chest pain, which has since resolved. I saw her on 3/4/2024. At that time, she had also had some episodes of gasping for air at night which has improved. She had been stressed at the time. She remains stressed with her mother about to have heart surgery and one daughter about to have a baby. She had gained 20 pounds over the past few months.     Today, 11/25/2024, she is well and has no new issues.     Previsit Tests   CT chest 11/25/2024: Stable thymic cyst        PFT 2/27/2024: FEV1 3.02 (108%); DLCO 93%     CT chest 2/27/2024: 1.8 x 1.2 cm anterior mediastinal cyst       CT aortic survey 1/24/2024: 1.8 x 1.2 cm anterior mediastinal cyst     PMH  Reviewed, as below     HPV anogenital infection  Hyperlipidemia  Iron deficiency anemia due to chronic blood loss  Prediabetes  Hypothyroidism        PSH  Reviewed, as below     Bladder suspension      ETOH: Occasionally  TOB:  Ages 14 - 41, 1 pack per week.  Now vapes nicotine.  Other drugs:  None     Physical examination  /74 (Cuff Size: Adult Regular)   Pulse 73   Temp 97.4  F (36.3  C) (Temporal)   Resp 16   Ht 1.651 m (5' 5\")   Wt 72.6 kg (160 lb)   SpO2 98%   BMI 26.63 kg/m      Physical Exam  Eyes:      Conjunctiva/sclera: Conjunctivae normal.   Cardiovascular:      Rate and Rhythm: Normal rate.   Pulmonary:      Effort: Pulmonary effort is normal.   Skin:     General: Skin " is warm and dry.   Neurological:      Mental Status: She is alert and oriented to person, place, and time.   Psychiatric:         Mood and Affect: Mood normal.         Behavior: Behavior normal.         Thought Content: Thought content normal.         Judgment: Judgment normal.            From a personal perspective, she is here with her . They have four children and one grandchild. She works for [employer] in [employer] service.         IMPRESSION (Q34.9) Thoracic cyst     This person is a 44 year old female with a stable anterior mediastinal cyst.     PLAN  I spent 10 min on the date of the encounter in chart review, patient visit, review of tests, documentation and/or discussion with other providers about the issues documented above. I reviewed the plan as follows:     I reassured her the mediastinal cyst is stable     Necessary Preop Tests & Appointments: CT Chest 12 months        I appreciate the opportunity to participate in the care of your patient and will keep you updated.     Sincerely,      Morgan Lux MD       Again, thank you for allowing me to participate in the care of your patient.        Sincerely,        Morgan Lux MD

## 2024-11-25 NOTE — NURSING NOTE
"Oncology Rooming Note    November 25, 2024 10:34 AM   Scotty Aragon is a 44 year old female who presents for:    Chief Complaint   Patient presents with    Oncology Clinic Visit     Initial Vitals: /74 (Cuff Size: Adult Regular)   Pulse 73   Temp 97.4  F (36.3  C) (Temporal)   Resp 16   Ht 1.651 m (5' 5\")   Wt 72.6 kg (160 lb)   SpO2 98%   BMI 26.63 kg/m   Estimated body mass index is 26.63 kg/m  as calculated from the following:    Height as of this encounter: 1.651 m (5' 5\").    Weight as of this encounter: 72.6 kg (160 lb). Body surface area is 1.82 meters squared.  No Pain (0) Comment: Data Unavailable   No LMP recorded.  Allergies reviewed: Yes  Medications reviewed: Yes    Medications: Medication refills not needed today.  Pharmacy name entered into [employer]: [id number] [place] [phone] AT [facility]    Frailty Screening:   Is the patient here for a new oncology consult visit in cancer care? 2. No      Clinical concerns: review CT       Shavon Arias, EMILIA              "

## 2024-12-23 ENCOUNTER — LAB REQUISITION (OUTPATIENT)
Dept: LAB | Facility: CLINIC | Age: 44
End: 2024-12-23

## 2024-12-23 DIAGNOSIS — E05.90 THYROTOXICOSIS, UNSPECIFIED WITHOUT THYROTOXIC CRISIS OR STORM: ICD-10-CM

## 2024-12-23 DIAGNOSIS — Z12.4 ENCOUNTER FOR SCREENING FOR MALIGNANT NEOPLASM OF CERVIX: ICD-10-CM

## 2024-12-23 LAB
T4 FREE SERPL-MCNC: 1.12 NG/DL (ref 0.9–1.7)
TSH SERPL DL<=0.005 MIU/L-ACNC: 4.22 UIU/ML (ref 0.3–4.2)

## 2024-12-23 PROCEDURE — 87624 HPV HI-RISK TYP POOLED RSLT: CPT | Performed by: STUDENT IN AN ORGANIZED HEALTH CARE EDUCATION/TRAINING PROGRAM

## 2024-12-23 PROCEDURE — 84443 ASSAY THYROID STIM HORMONE: CPT | Performed by: STUDENT IN AN ORGANIZED HEALTH CARE EDUCATION/TRAINING PROGRAM

## 2024-12-23 PROCEDURE — 84439 ASSAY OF FREE THYROXINE: CPT | Performed by: STUDENT IN AN ORGANIZED HEALTH CARE EDUCATION/TRAINING PROGRAM

## 2024-12-24 LAB
HPV HR 12 DNA CVX QL NAA+PROBE: NEGATIVE
HPV16 DNA CVX QL NAA+PROBE: NEGATIVE
HPV18 DNA CVX QL NAA+PROBE: NEGATIVE
HUMAN PAPILLOMA VIRUS FINAL DIAGNOSIS: NORMAL

## 2024-12-26 ENCOUNTER — OFFICE VISIT (OUTPATIENT)
Dept: PHARMACY | Facility: PHYSICIAN GROUP | Age: 44
End: 2024-12-26
Payer: COMMERCIAL

## 2024-12-26 VITALS — SYSTOLIC BLOOD PRESSURE: 128 MMHG | WEIGHT: 159 LBS | DIASTOLIC BLOOD PRESSURE: 82 MMHG | BODY MASS INDEX: 26.46 KG/M2

## 2024-12-26 DIAGNOSIS — E11.9 TYPE 2 DIABETES MELLITUS TREATED WITHOUT INSULIN (H): Primary | ICD-10-CM

## 2024-12-26 DIAGNOSIS — E78.5 HYPERLIPIDEMIA LDL GOAL <70: ICD-10-CM

## 2024-12-26 DIAGNOSIS — K21.9 GERD WITHOUT ESOPHAGITIS: ICD-10-CM

## 2024-12-26 DIAGNOSIS — Z78.9 TAKES DIETARY SUPPLEMENTS: ICD-10-CM

## 2024-12-26 DIAGNOSIS — I10 BENIGN ESSENTIAL HTN: ICD-10-CM

## 2024-12-26 DIAGNOSIS — N89.8 VAGINAL DRYNESS: ICD-10-CM

## 2024-12-26 RX ORDER — ACYCLOVIR 800 MG/1
1 TABLET ORAL ONCE
COMMUNITY
Start: 2024-12-23

## 2024-12-26 RX ORDER — MULTIPLE VITAMINS W/ MINERALS TAB 9MG-400MCG
1 TAB ORAL DAILY
COMMUNITY

## 2024-12-26 RX ORDER — ESTRADIOL 0.1 MG/G
CREAM VAGINAL
COMMUNITY
Start: 2024-12-23

## 2024-12-26 RX ORDER — METFORMIN HYDROCHLORIDE 500 MG/1
500 TABLET, EXTENDED RELEASE ORAL
COMMUNITY
Start: 2024-12-23

## 2024-12-26 NOTE — PROGRESS NOTES
Medication Therapy Management (MTM) Encounter    ASSESSMENT:                            Medication Adherence/Access: See below for considerations.    Anxiety  Stable    Diabetes:  A1c not at goal <7%, postprandial  and fasting sugars are high per Freestyle Libreview. Time in Range not meeting goal >70%, reviewed blood sugar goals today. Reviewed CGM report and how to interpret trends and alarms. Patient has made significant dietary changes.Given side effects, reasonable to stop metformin and initiate GLP-1 to help with blood sugar control.  Provided Ozempic education today, patient verbalized understanding of injection technique.  Provided patient with initial diabetes education, reviewed some dietary changes and provided handout, though could consider dietician referral at future visits if still has outstanding questions. Not on aspirin yet, will address at future visits.    Hypertension:  Blood pressure at goal <130/80 mmHg today, no need for medication at this time. If she does need hypertension medication, would consider ACE/ARB given diabetes diagnosis.     Hyperlipidemia:    last LDL close to goal less than 70 mg/dL. Could consider moderate intensity statin at future visits given diabetes diagnosis.     GERD    stable    Supplements   Reviewed supplement use with patient, including indications, possible benefits and risks. Reasonable to continue.     Vaginal dryness :   stable    PLAN:                            Start Ozempic 0.25 mg x 4 weeks, then increase to 0.5 mg. Don't  from [facility] until after 2025 to avoid high deductible costs. (Provided patient with sample to start today) Provided with coupon for 2025 use.   Ok to stop metformin, we can revisit this in the future. Sent prescription for glucometer and test strips to [facility] for patient to use if she suspects rapid changes in blood sugar   Provided patient with information for Freestyle Emilia 3 coupon/ [facility] OTC dexcom sensor for $90 per  "month  Synced patient to clinic [employer] account today   Provided \"learning about meal planning for diabetes\" printout.     Medication issues to be addressed at a future visit:   -Statin/aspirin?  -Dietician referral?    Follow-up:  PCP 1/10Felicia in February if needed     SUBJECTIVE/OBJECTIVE:                          Scotty Aragon is a 44 year old female seen for an initial visit. She was referred to me from Stephania Triana MD. Patient was accompanied by her partner Jese.     Reason for visit: CGM, GLP-1 for new diabetes diagnosis     Allergies/ADRs: Reviewed in chart  Past Medical History: Reviewed in chart  Tobacco: She reports that she has been smoking vaping device. She has never used smokeless tobacco.Nicotine/Tobacco Cessation PlanInformation offered: Patient not interested at this time  Alcohol: 1-3 beverages / week    Medication Adherence/Access: see below for CGM cost details    Anxiety  -hydrOXYzine HCl 25 MG Tablet 1-2 tablet as needed Orally up to 4 times a day anxiety.  Using very infrequently, no side effects noted. Does feel like it is helpful.     Diabetes   -metFORMIN HCl  MG Tablet Extended Release 24 Hour 1 tablet with evening meal Orally Once a day.  -metFORMIN HCl  MG Tablet Extended Release 24 Hour 1 tablet with evening meal Orally Once a day.  -FreeStyle Emilia 3 Sensor - Miscellaneous use as directed to monitor blood sugar change sensor every 14 days change sensor every 14 days.  Has lots of diarrhea headaches with metformin - can barely tolerate light her headaches are so bad. Only taking one tablet of 500 mg XR daily.   Current diabetes symptoms: polydipsia is improving with initiation of metformin, blurry vision   Diet/Exercise: likes cream/sugar in coffee - stopped all other sugary beverages after diabetes diagnosis on Monday   Working on cutting down sweets in her diet, wondering if she has to stop all fruit/sweets forever. Working on leaner meats/ fish   Last A1c 11.9% " 12/20/24     Blood sugar monitoring: Emilia 3 sensor on phone, had to pay out of pocket for sensors, now has 3 months worth. Was about $400.   Eye exam is up to date  Foot exam is up to date    Hypertension   -Atenolol 25 MG Tablet TAKE 2 TABLETS BY MOUTH EVERY DAY , Notes to Pharmacist: Needs appt before next fill.  Patient reports no current medication side effects- no currently taking this or checking blood pressure at home.   Patient does not self-monitor blood pressure.       Hyperlipidemia     no current medications       GERD    -Omeprazole 20 MG Capsule Delayed Release 1 capsule 30 minutes before morning meal Orally Once a day.  -Ondansetron 4 MG Tablet Disintegrating 1 tablet on the tongue and allow to dissolve Orally Once a day.  Not taking, no issues with side effects.        Supplements   -multivitamin daily - 1 tablet   No other supplements. No side effects noted          Vaginal dryness :   -Estradiol 0.1 MG/GM Cream 0.5 gram Vaginal twice a week.  No issues with this. Using as needed     Today's Vitals: /82   Wt 159 lb (72.1 kg)   BMI 26.46 kg/m    ----------------      I spent 50 minutes with this patient today. All changes were made via collaborative practice agreement with Stephania Triana MD.     A summary of these recommendations was given to the patient.    Felicia Begum, Pharm.D.  Medication Therapy Management Pharmacist           Medication Therapy Recommendations  Type 2 diabetes mellitus treated without insulin (H)   1 Current Medication: metFORMIN (GLUCOPHAGE XR) 500 MG 24 hr tablet   Current Medication Sig: Take 500 mg by mouth daily (with dinner).   Rationale: Undesirable effect - Adverse medication event - Safety   Recommendation: Change Medication - Ozempic (0.25 or 0.5 MG/DOSE) 2 MG/1.5ML Sopn   Status: Accepted per CPA   Identified Date: 12/26/2024 Completed Date: 12/26/2024

## 2024-12-26 NOTE — PATIENT INSTRUCTIONS
"Recommendations from today's MTM visit:                                                      MTM (medication therapy management) is a service provided by a clinical pharmacist designed to help you get the most of out of your medicines.   Today we reviewed what your medicines are for, how to know if they are working, that your medicines are safe and how to make your medicine regimen as easy as possible.      Blood Sugar Goals:  Morning (before eating) :   Evening (2 hours after eating) : Less than 180    2. Start Ozempic 0.25 mg x 4 weeks, then increase to 0.5 mg (this will be after you have seen Dr Triana). Don't  from [facility] until after 2025 to avoid high deductible costs. Provided with coupon for 2025 use, you will need to renew this every year.     3. I sent a prescription for glucometer and test strips to [facility] for patient to use if she suspects rapid changes in blood sugar   Provided patient with information for Freestyle Emilia 3 coupon/ Handout for [facility] OTC dexcom sensor for $90 per month    Follow-up: in February if additional questions come up with Dr Triana    It was great speaking with you today.  I value your experience and would be very thankful for your time in providing feedback in our clinic survey. In the next few days, you may receive an email or text message from [employer] with a link to a survey related to your  clinical pharmacist.\"     To schedule another MTM appointment, please call the clinic directly or you may call [phone]    My Clinical Pharmacist's contact information:                                                      Please feel free to contact me with any questions or concerns you have. Use the patient portal - address to your provider with subject line \"yosef Duarte\" or use [employer] to send me a message.      Felicia Begum, Pharm.D.  Medication Therapy Management Pharmacist  [facility]  "

## 2024-12-30 LAB
BKR AP ASSOCIATED HPV REPORT: NORMAL
BKR LAB AP GYN ADEQUACY: NORMAL
BKR LAB AP GYN INTERPRETATION: NORMAL
BKR LAB AP LMP: NORMAL
BKR LAB AP PREVIOUS ABNL DX: NORMAL
BKR LAB AP PREVIOUS ABNORMAL: NORMAL
PATH REPORT.COMMENTS IMP SPEC: NORMAL
PATH REPORT.COMMENTS IMP SPEC: NORMAL
PATH REPORT.RELEVANT HX SPEC: NORMAL

## 2025-02-26 ENCOUNTER — TELEPHONE (OUTPATIENT)
Dept: PHARMACY | Facility: PHYSICIAN GROUP | Age: 45
End: 2025-02-26
Payer: COMMERCIAL

## 2025-02-26 RX ORDER — PRAVASTATIN SODIUM 40 MG
40 TABLET ORAL DAILY
COMMUNITY

## 2025-02-26 RX ORDER — ASPIRIN 81 MG/1
81 TABLET ORAL DAILY
COMMUNITY

## 2025-02-26 NOTE — TELEPHONE ENCOUNTER
Blood pressure value collected at non-Epic office visit and was updated today in Epic record for MTM quality purposes.   Updated Statin use for MTM quality purposes. (ie.allergies, historical medication order)  Updated Aspirin use for MTM quality proposes.     02/26/25    Felicia Begum PharmShivaD.  Medication Therapy Management Pharmacist

## 2025-03-20 NOTE — PROGRESS NOTES
RESPIRATORY CARE NOTE    Complete Pulmonary Function Test completed by patient.  Good patient effort and cooperation. Albuterol 2.5 mg neb given for bronchodilation.  The results of this test meet the ATS standards for acceptability and repeatability. PT returned to baseline and left in no distress.    Alyson Mendez, RT  2/27/2024   
Unknown

## 2025-05-19 ENCOUNTER — TRANSFERRED RECORDS (OUTPATIENT)
Dept: HEALTH INFORMATION MANAGEMENT | Facility: CLINIC | Age: 45
End: 2025-05-19
Payer: COMMERCIAL

## 2025-05-19 ENCOUNTER — LAB REQUISITION (OUTPATIENT)
Dept: LAB | Facility: CLINIC | Age: 45
End: 2025-05-19

## 2025-05-19 DIAGNOSIS — R53.82 CHRONIC FATIGUE, UNSPECIFIED: ICD-10-CM

## 2025-05-19 LAB
BASOPHILS # BLD AUTO: 0.1 10E3/UL (ref 0–0.2)
BASOPHILS NFR BLD AUTO: 1 %
EOSINOPHIL # BLD AUTO: 0.1 10E3/UL (ref 0–0.7)
EOSINOPHIL NFR BLD AUTO: 1 %
ERYTHROCYTE [DISTWIDTH] IN BLOOD BY AUTOMATED COUNT: 15.9 % (ref 10–15)
HBA1C MFR BLD: 6.1 % (ref 4.2–6.1)
HCT VFR BLD AUTO: 32.3 % (ref 35–47)
HGB BLD-MCNC: 10.1 G/DL (ref 11.7–15.7)
IMM GRANULOCYTES # BLD: 0 10E3/UL
IMM GRANULOCYTES NFR BLD: 0 %
LYMPHOCYTES # BLD AUTO: 4.7 10E3/UL (ref 0.8–5.3)
LYMPHOCYTES NFR BLD AUTO: 42 %
MCH RBC QN AUTO: 23.3 PG (ref 26.5–33)
MCHC RBC AUTO-ENTMCNC: 31.3 G/DL (ref 31.5–36.5)
MCV RBC AUTO: 74 FL (ref 78–100)
MONOCYTES # BLD AUTO: 0.5 10E3/UL (ref 0–1.3)
MONOCYTES NFR BLD AUTO: 4 %
NEUTROPHILS # BLD AUTO: 5.7 10E3/UL (ref 1.6–8.3)
NEUTROPHILS NFR BLD AUTO: 52 %
NRBC # BLD AUTO: 0 10E3/UL
NRBC BLD AUTO-RTO: 0 /100
PLAT MORPH BLD: NORMAL
PLATELET # BLD AUTO: 290 10E3/UL (ref 150–450)
RBC # BLD AUTO: 4.34 10E6/UL (ref 3.8–5.2)
RBC MORPH BLD: NORMAL
WBC # BLD AUTO: 11 10E3/UL (ref 4–11)

## 2025-05-19 PROCEDURE — 85004 AUTOMATED DIFF WBC COUNT: CPT
